# Patient Record
Sex: FEMALE | Race: WHITE | NOT HISPANIC OR LATINO | Employment: FULL TIME | URBAN - METROPOLITAN AREA
[De-identification: names, ages, dates, MRNs, and addresses within clinical notes are randomized per-mention and may not be internally consistent; named-entity substitution may affect disease eponyms.]

---

## 2019-09-23 ENCOUNTER — RECORDS - HEALTHEAST (OUTPATIENT)
Dept: LAB | Facility: HOSPITAL | Age: 39
End: 2019-09-23

## 2019-09-24 LAB — RUBV IGG SERPL QL IA: POSITIVE

## 2019-09-25 LAB
GAMMA INTERFERON BACKGROUND BLD IA-ACNC: 0.03 IU/ML
M TB IFN-G BLD-IMP: NEGATIVE
MEV IGG SER IA-ACNC: POSITIVE
MITOGEN IGNF BCKGRD COR BLD-ACNC: -0.01 IU/ML
MITOGEN IGNF BCKGRD COR BLD-ACNC: 0 IU/ML
MUV IGG SER QL IA: POSITIVE
QTF INTERPRETATION: NORMAL
QTF MITOGEN - NIL: 8.62 IU/ML
VZV IGG SER QL IA: POSITIVE

## 2019-11-22 ENCOUNTER — RECORDS - HEALTHEAST (OUTPATIENT)
Dept: LAB | Facility: HOSPITAL | Age: 39
End: 2019-11-22

## 2019-11-22 LAB — HBV SURFACE AB SERPL IA-ACNC: POSITIVE M[IU]/ML

## 2023-09-28 ENCOUNTER — PATIENT OUTREACH (OUTPATIENT)
Dept: ONCOLOGY | Facility: CLINIC | Age: 43
End: 2023-09-28

## 2023-09-28 ENCOUNTER — APPOINTMENT (OUTPATIENT)
Dept: GENERAL RADIOLOGY | Facility: CLINIC | Age: 43
End: 2023-09-28
Attending: FAMILY MEDICINE
Payer: COMMERCIAL

## 2023-09-28 ENCOUNTER — HOSPITAL ENCOUNTER (EMERGENCY)
Facility: CLINIC | Age: 43
Discharge: HOME | End: 2023-09-28
Attending: FAMILY MEDICINE | Admitting: FAMILY MEDICINE
Payer: COMMERCIAL

## 2023-09-28 ENCOUNTER — APPOINTMENT (OUTPATIENT)
Dept: CT IMAGING | Facility: CLINIC | Age: 43
End: 2023-09-28
Attending: FAMILY MEDICINE
Payer: COMMERCIAL

## 2023-09-28 VITALS
SYSTOLIC BLOOD PRESSURE: 139 MMHG | HEART RATE: 85 BPM | TEMPERATURE: 98.1 F | RESPIRATION RATE: 18 BRPM | HEIGHT: 67 IN | DIASTOLIC BLOOD PRESSURE: 114 MMHG | BODY MASS INDEX: 23.54 KG/M2 | WEIGHT: 150 LBS

## 2023-09-28 DIAGNOSIS — R07.89 CHEST PAIN, NON-CARDIAC: ICD-10-CM

## 2023-09-28 DIAGNOSIS — R91.1 PULMONARY NODULE: ICD-10-CM

## 2023-09-28 LAB
ALBUMIN SERPL BCG-MCNC: 4.3 G/DL (ref 3.5–5.2)
ALP SERPL-CCNC: 63 U/L (ref 35–104)
ALT SERPL W P-5'-P-CCNC: 47 U/L (ref 0–50)
ANION GAP SERPL CALCULATED.3IONS-SCNC: 13 MMOL/L (ref 7–15)
AST SERPL W P-5'-P-CCNC: 38 U/L (ref 0–45)
BASOPHILS # BLD AUTO: 0 10E3/UL (ref 0–0.2)
BASOPHILS NFR BLD AUTO: 1 %
BILIRUB SERPL-MCNC: 0.9 MG/DL
BUN SERPL-MCNC: 12.8 MG/DL (ref 6–20)
CALCIUM SERPL-MCNC: 9 MG/DL (ref 8.6–10)
CHLORIDE SERPL-SCNC: 99 MMOL/L (ref 98–107)
CREAT SERPL-MCNC: 0.61 MG/DL (ref 0.51–0.95)
D DIMER PPP FEU-MCNC: <0.27 UG/ML FEU (ref 0–0.5)
EGFRCR SERPLBLD CKD-EPI 2021: >90 ML/MIN/1.73M2
EOSINOPHIL # BLD AUTO: 0.3 10E3/UL (ref 0–0.7)
EOSINOPHIL NFR BLD AUTO: 4 %
ERYTHROCYTE [DISTWIDTH] IN BLOOD BY AUTOMATED COUNT: 12.8 % (ref 10–15)
FLUAV RNA SPEC QL NAA+PROBE: NEGATIVE
FLUBV RNA RESP QL NAA+PROBE: NEGATIVE
GLUCOSE SERPL-MCNC: 148 MG/DL (ref 70–99)
HCG SERPL QL: NEGATIVE
HCO3 SERPL-SCNC: 26 MMOL/L (ref 22–29)
HCT VFR BLD AUTO: 42 % (ref 35–47)
HGB BLD-MCNC: 13.9 G/DL (ref 11.7–15.7)
HOLD SPECIMEN: NORMAL
HOLD SPECIMEN: NORMAL
IMM GRANULOCYTES # BLD: 0 10E3/UL
IMM GRANULOCYTES NFR BLD: 0 %
LIPASE SERPL-CCNC: 21 U/L (ref 13–60)
LYMPHOCYTES # BLD AUTO: 1.8 10E3/UL (ref 0.8–5.3)
LYMPHOCYTES NFR BLD AUTO: 26 %
MCH RBC QN AUTO: 31 PG (ref 26.5–33)
MCHC RBC AUTO-ENTMCNC: 33.1 G/DL (ref 31.5–36.5)
MCV RBC AUTO: 94 FL (ref 78–100)
MONOCYTES # BLD AUTO: 0.6 10E3/UL (ref 0–1.3)
MONOCYTES NFR BLD AUTO: 8 %
NEUTROPHILS # BLD AUTO: 4.4 10E3/UL (ref 1.6–8.3)
NEUTROPHILS NFR BLD AUTO: 61 %
NRBC # BLD AUTO: 0 10E3/UL
NRBC BLD AUTO-RTO: 0 /100
PLATELET # BLD AUTO: 321 10E3/UL (ref 150–450)
POTASSIUM SERPL-SCNC: 4.1 MMOL/L (ref 3.4–5.3)
PROT SERPL-MCNC: 7.1 G/DL (ref 6.4–8.3)
RBC # BLD AUTO: 4.48 10E6/UL (ref 3.8–5.2)
RSV RNA SPEC NAA+PROBE: NEGATIVE
SARS-COV-2 RNA RESP QL NAA+PROBE: NEGATIVE
SODIUM SERPL-SCNC: 138 MMOL/L (ref 135–145)
TROPONIN T SERPL HS-MCNC: <6 NG/L
WBC # BLD AUTO: 7.2 10E3/UL (ref 4–11)

## 2023-09-28 PROCEDURE — 71046 X-RAY EXAM CHEST 2 VIEWS: CPT

## 2023-09-28 PROCEDURE — 250N000013 HC RX MED GY IP 250 OP 250 PS 637: Performed by: FAMILY MEDICINE

## 2023-09-28 PROCEDURE — 99284 EMERGENCY DEPT VISIT MOD MDM: CPT | Mod: 25 | Performed by: FAMILY MEDICINE

## 2023-09-28 PROCEDURE — 80053 COMPREHEN METABOLIC PANEL: CPT | Performed by: FAMILY MEDICINE

## 2023-09-28 PROCEDURE — 250N000011 HC RX IP 250 OP 636: Performed by: FAMILY MEDICINE

## 2023-09-28 PROCEDURE — 84703 CHORIONIC GONADOTROPIN ASSAY: CPT | Performed by: FAMILY MEDICINE

## 2023-09-28 PROCEDURE — 76604 US EXAM CHEST: CPT | Performed by: FAMILY MEDICINE

## 2023-09-28 PROCEDURE — 84484 ASSAY OF TROPONIN QUANT: CPT | Performed by: FAMILY MEDICINE

## 2023-09-28 PROCEDURE — 83690 ASSAY OF LIPASE: CPT | Performed by: FAMILY MEDICINE

## 2023-09-28 PROCEDURE — 76604 US EXAM CHEST: CPT | Mod: 26 | Performed by: FAMILY MEDICINE

## 2023-09-28 PROCEDURE — 85379 FIBRIN DEGRADATION QUANT: CPT | Performed by: FAMILY MEDICINE

## 2023-09-28 PROCEDURE — 36415 COLL VENOUS BLD VENIPUNCTURE: CPT | Performed by: FAMILY MEDICINE

## 2023-09-28 PROCEDURE — 87637 SARSCOV2&INF A&B&RSV AMP PRB: CPT | Performed by: FAMILY MEDICINE

## 2023-09-28 PROCEDURE — 250N000009 HC RX 250: Performed by: FAMILY MEDICINE

## 2023-09-28 PROCEDURE — 93308 TTE F-UP OR LMTD: CPT | Performed by: FAMILY MEDICINE

## 2023-09-28 PROCEDURE — 93308 TTE F-UP OR LMTD: CPT | Mod: 26 | Performed by: FAMILY MEDICINE

## 2023-09-28 PROCEDURE — 85025 COMPLETE CBC W/AUTO DIFF WBC: CPT | Performed by: FAMILY MEDICINE

## 2023-09-28 PROCEDURE — 93005 ELECTROCARDIOGRAM TRACING: CPT | Performed by: FAMILY MEDICINE

## 2023-09-28 PROCEDURE — 71260 CT THORAX DX C+: CPT

## 2023-09-28 PROCEDURE — 93010 ELECTROCARDIOGRAM REPORT: CPT | Performed by: FAMILY MEDICINE

## 2023-09-28 PROCEDURE — 99285 EMERGENCY DEPT VISIT HI MDM: CPT | Mod: 25 | Performed by: FAMILY MEDICINE

## 2023-09-28 RX ORDER — NITROGLYCERIN 0.4 MG/1
0.4 TABLET SUBLINGUAL EVERY 5 MIN PRN
Status: DISCONTINUED | OUTPATIENT
Start: 2023-09-28 | End: 2023-09-28 | Stop reason: HOSPADM

## 2023-09-28 RX ORDER — SUCRALFATE ORAL 1 G/10ML
1 SUSPENSION ORAL ONCE
Status: COMPLETED | OUTPATIENT
Start: 2023-09-28 | End: 2023-09-28

## 2023-09-28 RX ORDER — ASPIRIN 81 MG/1
324 TABLET, CHEWABLE ORAL ONCE
Status: COMPLETED | OUTPATIENT
Start: 2023-09-28 | End: 2023-09-28

## 2023-09-28 RX ORDER — IOPAMIDOL 755 MG/ML
74 INJECTION, SOLUTION INTRAVASCULAR ONCE
Status: COMPLETED | OUTPATIENT
Start: 2023-09-28 | End: 2023-09-28

## 2023-09-28 RX ORDER — PANTOPRAZOLE SODIUM 40 MG/1
40 TABLET, DELAYED RELEASE ORAL ONCE
Status: COMPLETED | OUTPATIENT
Start: 2023-09-28 | End: 2023-09-28

## 2023-09-28 RX ADMIN — IOPAMIDOL 74 ML: 755 INJECTION, SOLUTION INTRAVENOUS at 09:44

## 2023-09-28 RX ADMIN — ASPIRIN 81 MG CHEWABLE TABLET 324 MG: 81 TABLET CHEWABLE at 08:00

## 2023-09-28 RX ADMIN — PANTOPRAZOLE SODIUM 40 MG: 40 TABLET, DELAYED RELEASE ORAL at 07:18

## 2023-09-28 RX ADMIN — SUCRALFATE 1 G: 1 SUSPENSION ORAL at 07:18

## 2023-09-28 RX ADMIN — SODIUM CHLORIDE 58 ML: 9 INJECTION, SOLUTION INTRAVENOUS at 09:45

## 2023-09-28 ASSESSMENT — ACTIVITIES OF DAILY LIVING (ADL)
ADLS_ACUITY_SCORE: 35

## 2023-09-28 ASSESSMENT — ENCOUNTER SYMPTOMS
BLOOD IN STOOL: 0
SINUS PRESSURE: 0
VOMITING: 0
FEVER: 0
SORE THROAT: 0
DIAPHORESIS: 0
ABDOMINAL PAIN: 0
WHEEZING: 0
SHORTNESS OF BREATH: 0
FREQUENCY: 0
COUGH: 0
HEADACHES: 0
PALPITATIONS: 0
DYSURIA: 0
DIARRHEA: 0
CHILLS: 0
NAUSEA: 0
CONSTIPATION: 0

## 2023-09-28 NOTE — ED TRIAGE NOTES
Pt presents with chest pain that started on Monday. States it is midsternal and at times will go up into her neck. On Monday the pain was severe enough to call EMS. The pain went up into her neck  and down both arms with some associated nausea.     Triage Assessment       Row Name 09/28/23 0707       Triage Assessment (Adult)    Airway WDL WDL       Respiratory WDL    Respiratory WDL WDL       Skin Circulation/Temperature WDL    Skin Circulation/Temperature WDL WDL       Cardiac WDL    Cardiac WDL X;chest pain       Chest Pain Assessment    Chest Pain Location midsternal    Chest Pain Radiation neck    Character burning  pulling    Precipitating Factors nothing    Alleviating Factors nothing    Associated Signs/Symptoms nausea  on Monday    Chest Pain Intervention 12-lead ECG obtained;cardiac monitor placed       Peripheral/Neurovascular WDL    Peripheral Neurovascular WDL WDL       Cognitive/Neuro/Behavioral WDL    Cognitive/Neuro/Behavioral WDL WDL

## 2023-09-28 NOTE — DISCHARGE INSTRUCTIONS
ICD-10-CM    1. Pulmonary nodule  R91.1 Adult Oncology/Hematology [facility] Referral    follow-up nodule clinic      2. Chest pain, non-cardiac  R07.89     unclear cause but very reassuring chest pain eval.  possible other causes include chest wall (pulling sensation) and esophageal (central chest radiating to neck).  avoid chest wall overuse, work  neck range of motion. see Jean Carlos Youtube chest wall pain.  stay upright 2 hours after a meal.  64  oz clear fluid.  avoid caffeine, alcohol.  start prilosec 20 mg orally daily for 30 days. stay on pepcid for the first week.

## 2023-09-28 NOTE — PROGRESS NOTES
New IP (Interventional Pulmonology) referral rec'd.  Chart reviewed.    Claudia Trinidad CNP gave me the ok to schedule with her on WED 10/11 at [place]      New Patient: Interventional Pulmonary (Lung nodule) Nurse Navigator Note    Referring provider:   Jason Benton MD [facility] [phone]       Referred to (specialty): Interventional Pulmonary (Lung nodule)    Requested provider (if applicable): n/a    Date Referral Received: 9/28/2023    Evaluation for :  1.8 cm lung nodule incidental on ED CT    Clinical History (per Nurse review of records provided):    **BOOK MARKED**  CT CHEST W CONTRAST 9/28/2023 9:54 AM     CLINICAL HISTORY: chest pain.  incidental large lung nodule rt.  eval  dissection and characterize lesion  TECHNIQUE: CT chest with IV contrast. Multiplanar reformats were  obtained. Dose reduction techniques were used.     CONTRAST: 74 mL Isovue-370     COMPARISON: Chest x-ray 9/28/2023     FINDINGS:   LUNGS AND PLEURA: No consolidation. No pleural effusions or  pneumothorax. Correlating with the finding on prior chest x-ray, there  is a well-circumscribed, rounded right suprahilar low density nodule  measuring 1.7 x 1.6 x 1.8 cm series 3, image 54, measuring 2  Hounsfield units on postcontrast imaging. No visible calcification. No  additional nodules or masses identified.     MEDIASTINUM/AXILLAE: Coarse calcification and low density nodule in  the left thyroid lobe. No thoracic adenopathy. Residual thymus in the  anterior mediastinum. Normal cardiac size. No pericardial effusion.  Normal caliber thoracic aorta. No acute abnormality. Although not a  dedicated PE study, no gross PE. No coronary artery calcifications.     UPPER ABDOMEN: There are a couple of low-density lesions in the liver,  measuring up to 1.4 cm in the left hepatic lobe.     MUSCULOSKELETAL: Negative bones. Bilateral breast implants.                                                                       IMPRESSION:   1.  Correlating with the finding on prior chest x-ray, there is a 1.8  cm low density right suprahilar nodule in the upper lobe. This is  indeterminate in nature, but could be benign, such as a hamartoma.  Please see follow-up guidelines below.     2.  Although not a dedicated CTA, exam is negative for acute aortic  dissection or aneurysm. Lungs are clear.     3.  Indeterminate low-density lesions in the liver can be further  evaluated by nonemergent hepatic ultrasound.    Records Location (Care Everywhere, Media, etc.): [facility]     Records Needed: none    Additional testing needed prior to consult: NONE

## 2023-09-28 NOTE — ED PROVIDER NOTES
History     Chief Complaint   Patient presents with    Chest Pain     HPI  Belinda Mullen is a 42 year old female who presents with anterior chest pain started on Monday midsternal and radiating into the neck.  Was severe enough to call EMS and they arrived evaluated scene she was not seen after that.  She has pain at times radiating to both arms and nausea.  No known history of heart disease.  She does take chronic Pepcid but did not relieve her current symptoms.  She was experiencing right upper abdominal pain.  She is status postcholecystectomy.    No known VTE risk.  History of diabetes diet-controlled A1c 6.5% consistently.  No associated exertional chest pain.  Pain may be worse in the chest associated with deep breathing.  No associated dyspnea.  No leg swelling.  No history of known heart or lung disease.  No asthma history.  Uses no tobacco.  Occasional alcohol.  Denies current pregnancy as she has an IUD in place and her fiancé had a vasectomy.  She has not had menstrual period in a decade she tells me.    Most of her pain is in the region of the upper chest and neck.  No associated neurologic changes.    Denies recent prolonged travel (>3 hours) by car or plane, history or FHx of venous thromboembolism, recent surgery (last 4 weeks), active cancer history, hypercoagulable state, estrogen or other medications/conditions causing VTE or  new unilateral swelling or pain in the legs or calves.        Allergies:  Not on File    Problem List:    There are no problems to display for this patient.       Past Medical History:    No past medical history on file.    Past Surgical History:    No past surgical history on file.    Family History:    No family history on file.    Social History:  Marital Status:          Medications:    No current outpatient medications on file.        Review of Systems   Constitutional:  Negative for chills, diaphoresis and fever.   HENT:  Negative for ear pain, sinus pressure and sore  "throat.    Eyes:  Negative for visual disturbance.   Respiratory:  Negative for cough, shortness of breath and wheezing.    Cardiovascular:  Positive for chest pain. Negative for palpitations.   Gastrointestinal:  Negative for abdominal pain, blood in stool, constipation, diarrhea, nausea and vomiting.   Genitourinary:  Negative for dysuria, frequency and urgency.   Skin:  Negative for rash.   Neurological:  Negative for headaches.   All other systems reviewed and are negative.      Physical Exam   BP: (!) 139/114  Pulse: 85  Temp: 98.1  F (36.7  C)  Resp: 18  Height: 170.2 cm (5' 7\")  Weight: 68 kg (150 lb)      Physical Exam  Constitutional:       General: She is in acute distress.      Appearance: She is not diaphoretic.   HENT:      Head: Atraumatic.   Eyes:      Conjunctiva/sclera: Conjunctivae normal.   Cardiovascular:      Rate and Rhythm: Normal rate and regular rhythm.      Heart sounds: No murmur heard.  Pulmonary:      Effort: Pulmonary effort is normal. No respiratory distress.      Breath sounds: Normal breath sounds. No stridor. No wheezing, rhonchi or rales.   Chest:      Chest wall: No tenderness.   Abdominal:      General: Abdomen is flat. There is no distension.      Palpations: Abdomen is soft. There is no mass.      Tenderness: There is no abdominal tenderness. There is no guarding.   Musculoskeletal:      Cervical back: Neck supple.      Right lower leg: No edema.      Left lower leg: No edema.   Skin:     Coloration: Skin is not pale.      Findings: No rash.   Neurological:      General: No focal deficit present.      Mental Status: She is alert.      Motor: No weakness.       Symmetric radial pulses.    ED Course                 Procedures                EKG Interpretation:      Interpreted by Jason Benton MD  EKG done at 0709 hrs. demonstrates a sinus rhythm 77 bpm normal axis.  There is no ST change.  No T wave changes.  Normal R progression and no Q waves.  Normal intervals.  Normal " conduction.  No ectopy.  Impression sinus rhythm 77 bpm no acute change.    Critical Care time:  none               Results for orders placed or performed during the hospital encounter of 09/28/23 (from the past 24 hour(s))   Troponin T, High Sensitivity   Result Value Ref Range    Troponin T, High Sensitivity <6 <=14 ng/L   CBC with platelets, differential    Narrative    The following orders were created for panel order CBC with platelets, differential.  Procedure                               Abnormality         Status                     ---------                               -----------         ------                     CBC with platelets and d...[[id number]]                      Final result                 Please view results for these tests on the individual orders.   Emmet Draw    Narrative    The following orders were created for panel order Emmet Draw.  Procedure                               Abnormality         Status                     ---------                               -----------         ------                     Extra Blue Top Tube[[id number]]                              Final result               Extra Red Top Tube[[id number]]                               Final result                 Please view results for these tests on the individual orders.   CBC with platelets and differential   Result Value Ref Range    WBC Count 7.2 4.0 - 11.0 10e3/uL    RBC Count 4.48 3.80 - 5.20 10e6/uL    Hemoglobin 13.9 11.7 - 15.7 g/dL    Hematocrit 42.0 35.0 - 47.0 %    MCV 94 78 - 100 fL    MCH 31.0 26.5 - 33.0 pg    MCHC 33.1 31.5 - 36.5 g/dL    RDW 12.8 10.0 - 15.0 %    Platelet Count 321 150 - 450 10e3/uL    % Neutrophils 61 %    % Lymphocytes 26 %    % Monocytes 8 %    % Eosinophils 4 %    % Basophils 1 %    % Immature Granulocytes 0 %    NRBCs per 100 WBC 0 <1 /100    Absolute Neutrophils 4.4 1.6 - 8.3 10e3/uL    Absolute Lymphocytes 1.8 0.8 - 5.3 10e3/uL    Absolute Monocytes 0.6 0.0 - 1.3 10e3/uL     Absolute Eosinophils 0.3 0.0 - 0.7 10e3/uL    Absolute Basophils 0.0 0.0 - 0.2 10e3/uL    Absolute Immature Granulocytes 0.0 <=0.4 10e3/uL    Absolute NRBCs 0.0 10e3/uL   Extra Blue Top Tube   Result Value Ref Range    Hold Specimen JIC    Extra Red Top Tube   Result Value Ref Range    Hold Specimen JIC    HCG qualitative pregnancy (blood)   Result Value Ref Range    hCG Serum Qualitative Negative Negative   D dimer quantitative   Result Value Ref Range    D-Dimer Quantitative <0.27 0.00 - 0.50 ug/mL FEU    Narrative    This D-dimer assay is intended for use in conjunction with a clinical pretest probability assessment model to exclude pulmonary embolism (PE) and deep venous thrombosis (DVT) in outpatients suspected of PE or DVT. The cut-off value is 0.50 ug/mL FEU.   Symptomatic Influenza A/B, RSV, & SARS-CoV2 PCR (COVID-19) Nose    Specimen: Nose; Swab   Result Value Ref Range    Influenza A PCR Negative Negative    Influenza B PCR Negative Negative    RSV PCR Negative Negative    SARS CoV2 PCR Negative Negative    Narrative    Testing was performed using the Xpert Xpress CoV2/Flu/RSV Assay on the Cepheid GeneXpert Instrument. This test should be ordered for the detection of SARS-CoV-2, influenza, and RSV viruses in individuals who meet clinical and/or epidemiological criteria. Test performance is unknown in asymptomatic patients. This test is for in vitro diagnostic use under the FDA EUA for laboratories certified under CLIA to perform high or moderate complexity testing. This test has not been FDA cleared or approved. A negative result does not rule out the presence of PCR inhibitors in the specimen or target RNA in concentration below the limit of detection for the assay. If only one viral target is positive but coinfection with multiple targets is suspected, the sample should be re-tested with another FDA cleared, approved, or authorized test, if coinfection would change clinical management. This test was  validated by the [facility]. These laboratories are certified under the Clinical Laboratory Improvement Amendments of 1988 (CLIA-88) as qualified to perform high complexity laboratory testing.   POC US CHEST B-SCAN    Impression    [facility] Procedure Note      Limited Bedside ED Cardiac Ultrasound:    PROCEDURE: PERFORMED BY: Dr. Jason Benton MD  INDICATIONS/SYMPTOM:  Chest Pain  PROBE: Cardiac phased array probe and High frequency linear probe  BODY LOCATION: Chest  FINDINGS:   The ultrasound was performed utilizing the subcostal, parasternal long axis, and parasternal short axis views.  Cardiac contractility:  Present  Gross estimation of cardiac kinesis: normal  Pericardial Effusion:  None  RV:LV ratio: LV > RV  IVC:    Diameter:  IVC diameter expiration (IVCe) 2 cm                                                   IVC diameter inspiration (IVCi) 0 cm                                                       Collapsibility:  IVC collapses > 50% with inspiration  INTERPRETATION:    Chamber size and motion were grossly normal with LV > RV, normal cardiac kinesis.  No pericardial effusion was found.  IVC visualized and findings indicate normovolemia.  IMAGE DOCUMENTATION: Images were archived to PACs system.        [facility] Procedure Note      Limited Bedside ED Ultrasound of Thorax:    PROCEDURE: PERFORMED BY: Dr. Jason Benton MD  INDICATIONS/SYMPTOM:  Chest pain  PROBE: High frequency linear probe  BODY LOCATION: Chest  FINDINGS:  Images of both lung hemithoracies taken in 2D in multiple rib spaces        Right side:  Lung sliding artifact  Present     Comet tail artifacts  Absent   Left side:  Lung sliding artifact  Present     Comet tail artifacts  Absent   Hemothorax: Right side Absent     Left side Absent   Pleural effusion: Right side Absent      Left side Absent    INTERPRETATION: The exam was normal. There was no free fluid identified in the chest cavity. No evidence  of pneumothorax, hemothorax or pleural effusion.  IMAGE DOCUMENTATION: Images were archived to PACs system.         Comprehensive metabolic panel   Result Value Ref Range    Sodium 138 135 - 145 mmol/L    Potassium 4.1 3.4 - 5.3 mmol/L    Carbon Dioxide (CO2) 26 22 - 29 mmol/L    Anion Gap 13 7 - 15 mmol/L    Urea Nitrogen 12.8 6.0 - 20.0 mg/dL    Creatinine 0.61 0.51 - 0.95 mg/dL    GFR Estimate >90 >60 mL/min/1.73m2    Calcium 9.0 8.6 - 10.0 mg/dL    Chloride 99 98 - 107 mmol/L    Glucose 148 (H) 70 - 99 mg/dL    Alkaline Phosphatase 63 35 - 104 U/L    AST 38 0 - 45 U/L    ALT 47 0 - 50 U/L    Protein Total 7.1 6.4 - 8.3 g/dL    Albumin 4.3 3.5 - 5.2 g/dL    Bilirubin Total 0.9 <=1.2 mg/dL   Lipase   Result Value Ref Range    Lipase 21 13 - 60 U/L   Chest XR,  PA & LAT    Narrative    CHEST TWO VIEWS  9/28/2023 8:46 AM     HISTORY:  Chest pain.    COMPARISON: None.      Impression    IMPRESSION: Indeterminate nodular density projected over the right  suprahilar region measures 2.3 cm. Chest CT is recommended to exclude  a pulmonary nodule. The lungs are otherwise clear. No pleural  effusions. Heart size and pulmonary vascularity are within normal  limits.     JOHNNIE BARJAAS MD         SYSTEM ID:  [id number]   Chest CT - IV contrast only - TRAUMA / DISSECTION    Narrative    CT CHEST W CONTRAST 9/28/2023 9:54 AM    CLINICAL HISTORY: chest pain.  incidental large lung nodule rt.  eval  dissection and characterize lesion  TECHNIQUE: CT chest with IV contrast. Multiplanar reformats were  obtained. Dose reduction techniques were used.    CONTRAST: 74 mL Isovue-370    COMPARISON: Chest x-ray 9/28/2023    FINDINGS:   LUNGS AND PLEURA: No consolidation. No pleural effusions or  pneumothorax. Correlating with the finding on prior chest x-ray, there  is a well-circumscribed, rounded right suprahilar low density nodule  measuring 1.7 x 1.6 x 1.8 cm series 3, image 54, measuring 2  Hounsfield units on postcontrast imaging.  No visible calcification. No  additional nodules or masses identified.    MEDIASTINUM/AXILLAE: Coarse calcification and low density nodule in  the left thyroid lobe. No thoracic adenopathy. Residual thymus in the  anterior mediastinum. Normal cardiac size. No pericardial effusion.  Normal caliber thoracic aorta. No acute abnormality. Although not a  dedicated PE study, no gross PE. No coronary artery calcifications.    UPPER ABDOMEN: There are a couple of low-density lesions in the liver,  measuring up to 1.4 cm in the left hepatic lobe.    MUSCULOSKELETAL: Negative bones. Bilateral breast implants.      Impression    IMPRESSION:   1.  Correlating with the finding on prior chest x-ray, there is a 1.8  cm low density right suprahilar nodule in the upper lobe. This is  indeterminate in nature, but could be benign, such as a hamartoma.  Please see follow-up guidelines below.    2.  Although not a dedicated CTA, exam is negative for acute aortic  dissection or aneurysm. Lungs are clear.    3.  Indeterminate low-density lesions in the liver can be further  evaluated by nonemergent hepatic ultrasound.    REFERENCE:  Guidelines for Management of Incidental Pulmonary Nodules Detected on  CT Images: From the Fleischner Society 2017.   Guidelines apply to incidental nodules in patients who are 35 years or  older.  Guidelines do not apply to lung cancer screening, patients with  immunosuppression, or patients with known primary cancer.    SINGLE NODULE    Nodule size >8 mm  Either low or high-risk patients:  Consider CT, PET/CT, or tissue sampling at 3 months.    Consider referral to lung nodule clinic.    LAM GONG MD         SYSTEM ID:  [id number]       Medications   sucralfate (CARAFATE) suspension 1 g (1 g Oral $Given 9/28/23 0718)   pantoprazole (PROTONIX) EC tablet 40 mg (40 mg Oral $Given 9/28/23 0718)       Assessments & Plan (with Medical Decision Making)     MDM: Belinda Mullen is a 42 year old female who  presents with chest pain onset Monday was severe enough to call an ambulance.  No associated other cardiopulmonary symptoms and not exertional.  Has not yet been seen for this.  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  Symptoms without significant relief with sucralfate.  Pain is primarily in the upper neck region and at times it will radiate into both arms as it did Monday.  That has not occurred since.  No associated significant gastrointestinal symptoms other than upper abdominal discomfort at times.  Does have underlying diabetes type 2 diet controlled A1c 6.5.  Initial blood pressure elevated but doubt the value given the narrow pulse pressure as the second systolic diastolic was normal.  EKG is nonischemic.  Her heart rate was elevated Monday night to as high as 130.  Although she has no known risk factors for PE, given the periodic elevated heart rate, the pleuritic pain, I would recommend that we obtain D-dimer labs as well and I did discuss this with her.  She has normal pulses symmetrically both radial.  My suspicion for aortic dissection is low.  I would however recommend evaluating for acute coronary syndrome, pulmonary embolism.  Her bedside ultrasound is reassuring with normal function.  No signs of right heart strain      Her chest x-ray did show an incidental lung nodule.  We discussed the findings.  This is fairly good-sized and will need CT imaging for characterization.  This could be performed out of a follow-up outpatient CT.  However she did describe symptoms that were worse as she extended and flexed her neck.  She was pointing in a region that was similar in the chest.  The 1 emergent condition that was not completely excluded was dissection although less likely.  We discussed that we could characterize this lesion and also CT with contrast.  She like to go forward with this.  The imaging was reassuring although his shows and characterize this nodule and will need follow-up.  Referred to the  nodule clinic.    As for the chest pain this could certainly be musculoskeletal she notes with head position and forward neck flexion she will have pain in this region.  Upper thoracic back could potentially cause a radicular symptom in this area.  It could also be chest wall muscle irritation.  I am also suspicious for gastrointestinal causes.  We discussed empiric management as below and following up in clinic.  I have given precautions for return.  Findings today are reassuring.    At more than 48 hours of symptoms a single troponin was more than sufficient.    I have reviewed the nursing notes.    I have reviewed the findings, diagnosis, plan and need for follow up with the patient.           Medical Decision Making  The patient's presentation was of moderate complexity (an acute illness with systemic symptoms).    The patient's evaluation involved:  ordering and/or review of 3+ test(s) in this encounter (see separate area of note for details)    The patient's management necessitated moderate risk (prescription drug management including medications given in the ED).        New Prescriptions    No medications on file       Final diagnoses:   Pulmonary nodule - follow-up nodule clinic   Chest pain, non-cardiac - unclear cause but very reassuring chest pain eval.  possible other causes include chest wall (pulling sensation) and esophageal (central chest radiating to neck).  avoid chest wall overuse, work  neck range of motion. see Rolando and Jadiel Youtube chest wall pain.  stay upright 2 hours after a meal.  64  oz clear fluid.  avoid caffeine, alcohol.  start prilosec 20 mg orally daily for 30 days. stay on pepcid for the first week.       9/28/2023   [facility] EMERGENCY DEPT       Jason Benton MD  09/28/23 2741

## 2023-10-10 NOTE — PROGRESS NOTES
THORACIC SURGERY - NEW PATIENT OFFICE VISIT      Dear Dr. Devries,    I saw Belinda Mullen at Dr. Benton request in consultation for the evaluation and treatment of an indeterminate right upper lobe lung nodule found during a work up for chest pain.     HPI  Belinda Mullen is a 42 year old female who presented to the Emergency Department on 09/28/2023 with complaints of anterior chest pain that also radiated to her neck. Cardiac work up was negative however on the PE chest CT a right hilar nodule was identified.    Previsit Tests   CT chest: 1.8 cm right supra hilar lymph node in the right upper lobe indeterminate in nature.   PMH  Reviewed, as below    Past Medical History:   Diagnosis Date    Diabetes (H)         PSH  Reviewed, as below    Past Surgical History:   Procedure Laterality Date    CHOLECYSTECTOMY        Social History     Socioeconomic History    Marital status: Single     Spouse name: Not on file    Number of children: Not on file    Years of education: Not on file    Highest education level: Not on file   Occupational History    Not on file   Tobacco Use    Smoking status: Never     Passive exposure: Never    Smokeless tobacco: Never   Substance and Sexual Activity    Alcohol use: Not on file    Drug use: Not on file    Sexual activity: Not on file   Other Topics Concern    Not on file   Social History Narrative    Not on file     Social Determinants of Health     Financial Resource Strain: Not on file   Food Insecurity: Not on file   Transportation Needs: Not on file   Physical Activity: Not on file   Stress: Not on file   Social Connections: Not on file   Interpersonal Safety: Not on file   Housing Stability: Not on file      SUBJECTIVE  Belinda denies shortness of breath or any respiratory symptoms. She talked to her mom about the CT finding and her mom reassured her that nodules are common and most likely all that would be needed is follow up imaging.    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    IMPRESSION   This person is a 42 year old female with an incidentally found right supra hilar lung nodule.       We reviewed the incidence of lung nodules. Approximately 50 of the general population in the [place] have at least one lung nodule. Most of these are not anything concerning. The [facility] soil has certain bacterial and fungal spores that can become airborne and then breathed in by us.Most of the time, the cilia (little hairs) in our nose catch them and we sneeze them out. Sometimes the spores can get past the cilia and make it into the airway and the airway spasms inducing us to cough the spore out. In some cases, the spore makes it all the way into the lungs. In most cases, our immune system walls them off to prevent them from causing trouble. This is what we are seeing on the images as a nodule.    Sometimes a nodule can be called \"new\" and this could very well be a new nodule or it could have been a nodule that was there before but just not picked up on prior images. In Belinda's case, we do not have prior imaging so this nodule could have been there for some time.    These nodules can resolve and sometimes they do not. This is why we watch them over a period of time with repeat chest imaging. This gives us information about the behavior of the nodule(s). If the nodule(s) has been stable (no changes) for two consecutive years, then statistically they are considered benign. In most cases, malignant nodules will have some sort of change over that time period. Of course, risk factor is a consideration in determining whether to continue to follow the nodule(s) for more than two years.      PLAN  I spent 30 min on the date of the encounter in chart review, patient visit, review of tests, documentation and/or " discussion with other providers about the issues documented above. I reviewed the plan as follows:    Follow up with me in 3 months with chest CT prior  Necessary Tests & Appointments: CT Chest    I appreciate the opportunity to participate in the care of your patient and will keep you updated.  Sincerely,    KRISTIN Pappas CNS

## 2023-10-11 ENCOUNTER — ONCOLOGY VISIT (OUTPATIENT)
Dept: SURGERY | Facility: CLINIC | Age: 43
End: 2023-10-11
Attending: FAMILY MEDICINE
Payer: COMMERCIAL

## 2023-10-11 VITALS
OXYGEN SATURATION: 100 % | BODY MASS INDEX: 23.54 KG/M2 | SYSTOLIC BLOOD PRESSURE: 106 MMHG | HEART RATE: 75 BPM | WEIGHT: 150 LBS | HEIGHT: 67 IN | TEMPERATURE: 97.5 F | DIASTOLIC BLOOD PRESSURE: 71 MMHG

## 2023-10-11 DIAGNOSIS — R91.1 PULMONARY NODULE: ICD-10-CM

## 2023-10-11 PROCEDURE — 99203 OFFICE O/P NEW LOW 30 MIN: CPT | Performed by: CLINICAL NURSE SPECIALIST

## 2023-10-11 RX ORDER — LORATADINE 10 MG/1
10 TABLET, ORALLY DISINTEGRATING ORAL DAILY
COMMUNITY

## 2023-10-11 RX ORDER — PROCHLORPERAZINE 25 MG/1
SUPPOSITORY RECTAL
COMMUNITY
Start: 2023-08-31

## 2023-10-11 RX ORDER — METFORMIN HCL 500 MG
500 TABLET, EXTENDED RELEASE 24 HR ORAL
COMMUNITY
Start: 2023-10-09

## 2023-10-11 ASSESSMENT — PAIN SCALES - GENERAL: PAINLEVEL: NO PAIN (0)

## 2023-12-21 ENCOUNTER — HOSPITAL ENCOUNTER (OUTPATIENT)
Dept: CT IMAGING | Facility: CLINIC | Age: 43
Discharge: HOME | End: 2023-12-21
Attending: CLINICAL NURSE SPECIALIST | Admitting: CLINICAL NURSE SPECIALIST
Payer: COMMERCIAL

## 2023-12-21 DIAGNOSIS — R91.1 PULMONARY NODULE: ICD-10-CM

## 2023-12-21 PROCEDURE — 71250 CT THORAX DX C-: CPT

## 2023-12-27 ENCOUNTER — OFFICE VISIT (OUTPATIENT)
Dept: SURGERY | Facility: CLINIC | Age: 43
End: 2023-12-27
Payer: COMMERCIAL

## 2023-12-27 VITALS
WEIGHT: 150 LBS | TEMPERATURE: 98.3 F | DIASTOLIC BLOOD PRESSURE: 98 MMHG | OXYGEN SATURATION: 98 % | SYSTOLIC BLOOD PRESSURE: 135 MMHG | HEIGHT: 67 IN | HEART RATE: 90 BPM | BODY MASS INDEX: 23.54 KG/M2

## 2023-12-27 DIAGNOSIS — R91.1 PULMONARY NODULE: Primary | ICD-10-CM

## 2023-12-27 PROCEDURE — 99213 OFFICE O/P EST LOW 20 MIN: CPT | Performed by: CLINICAL NURSE SPECIALIST

## 2023-12-27 RX ORDER — VITAMIN B COMPLEX
TABLET ORAL DAILY
COMMUNITY

## 2023-12-27 RX ORDER — FAMOTIDINE 20 MG/1
20 TABLET, FILM COATED ORAL 2 TIMES DAILY PRN
COMMUNITY

## 2023-12-27 ASSESSMENT — PAIN SCALES - GENERAL: PAINLEVEL: NO PAIN (0)

## 2023-12-27 NOTE — PROGRESS NOTES
"THORACIC SURGERY FOLLOW UP VISIT      I saw Ms. Belinda Mullen in follow-up today. 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 Consider follow-up in 6-12 months to assess for ongoing stability.    Past Medical History:   Diagnosis Date    Diabetes (H)       Past Surgical History:   Procedure Laterality Date    CHOLECYSTECTOMY        Social History     Socioeconomic History    Marital status: Single     Spouse name: Not on file    Number of children: Not on file    Years of education: Not on file    Highest education level: Not on file   Occupational History    Not on file   Tobacco Use    Smoking status: Never     Passive exposure: Never    Smokeless tobacco: Never   Substance and Sexual Activity    Alcohol use: Not on file    Drug use: Not on file    Sexual activity: Not on file   Other Topics Concern    Not on file   Social History Narrative    Not on file     Social Determinants of Health     Financial Resource Strain: Not on file   Food Insecurity: Not on file   Transportation Needs: Not on file   Physical Activity: Not on file   Stress: Not on file   Social Connections: Not on file   Interpersonal Safety: Not on file   Housing Stability: Not on file      SUBJECTIVE  Belinda denies any shortness of breath or cough but her kids did give her the crud over [place]. She was febrile for a few days but she is feeling better now.    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    IMPRESSION   43 year-old female who presented to the Emergency Department on 09/28/2023 with complaints of anterior chest pain that also radiated to her neck. Cardiac work " up was negative however on the PE chest CT a right hilar nodule was identified.  She is here today to review the results of her most recent chest CT for lung nodule follow up.    PLAN  I spent 25 min on the date of the encounter in chart review, patient visit, review of tests, documentation and/or discussion with other providers about the issues documented above. I reviewed the plan as follows:  Follow up with me in 6 months with chest CT prior  Necessary Tests & Appointments: chest CT  All questions were answered and the patient and present family were in agreement with the plan.  I appreciate the opportunity to participate in the care of your patient and will keep you updated.  Sincerely,

## 2023-12-27 NOTE — LETTER
"    12/27/2023         RE: Belinda Mullen  [address] [facility] [address]        Dear Colleague,    Thank you for referring your patient, Belinda Mullen, to the [facility]. Please see a copy of my visit note below.    THORACIC SURGERY FOLLOW UP VISIT      I saw Ms. Belinda Mullen in follow-up today. 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 Consider follow-up in 6-12 months to assess for ongoing stability.    Past Medical History:   Diagnosis Date     Diabetes (H)       Past Surgical History:   Procedure Laterality Date     CHOLECYSTECTOMY        Social History     Socioeconomic History     Marital status: Single     Spouse name: Not on file     Number of children: Not on file     Years of education: Not on file     Highest education level: Not on file   Occupational History     Not on file   Tobacco Use     Smoking status: Never     Passive exposure: Never     Smokeless tobacco: Never   Substance and Sexual Activity     Alcohol use: Not on file     Drug use: Not on file     Sexual activity: Not on file   Other Topics Concern     Not on file   Social History Narrative     Not on file     Social Determinants of Health     Financial Resource Strain: Not on file   Food Insecurity: Not on file   Transportation Needs: Not on file   Physical Activity: Not on file   Stress: Not on file   Social Connections: Not on file   Interpersonal Safety: Not on file   Housing Stability: Not on file      SUBJECTIVE  Belinda denies any shortness of breath or cough but her kids did give her the crud over [place]. She was febrile for a few days but she is feeling better now.    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    IMPRESSION   43 year-old female who presented to the Emergency Department on 09/28/2023 with complaints of anterior chest pain that also radiated to her neck. Cardiac work up was negative however on the PE chest CT a right hilar nodule was identified.  She is here today to review the results of her most recent chest CT for lung nodule follow up.    PLAN  I spent 25 min on the date of the encounter in chart review, patient visit, review of tests, documentation and/or discussion with other providers about the issues documented above. I reviewed the plan as follows:  Follow up with me in 6 months with chest CT prior  Necessary Tests & Appointments: chest CT  All questions were answered and the patient and present family were in agreement with the plan.  I appreciate the opportunity to participate in the care of your patient and will keep you updated.  Sincerely,      Again, thank you for allowing me to participate in the care of your patient.        Sincerely,        KRISTIN Pappas CNS

## 2023-12-30 ENCOUNTER — HEALTH MAINTENANCE LETTER (OUTPATIENT)
Age: 43
End: 2023-12-30

## 2024-06-20 ENCOUNTER — HOSPITAL ENCOUNTER (OUTPATIENT)
Dept: CT IMAGING | Facility: CLINIC | Age: 44
Discharge: HOME | End: 2024-06-20
Attending: CLINICAL NURSE SPECIALIST | Admitting: CLINICAL NURSE SPECIALIST
Payer: COMMERCIAL

## 2024-06-20 ENCOUNTER — MYC MEDICAL ADVICE (OUTPATIENT)
Dept: SURGERY | Facility: CLINIC | Age: 44
End: 2024-06-20
Payer: COMMERCIAL

## 2024-06-20 DIAGNOSIS — R91.1 PULMONARY NODULE: ICD-10-CM

## 2024-06-20 PROCEDURE — 71250 CT THORAX DX C-: CPT

## 2024-06-21 DIAGNOSIS — R91.1 PULMONARY NODULE: Primary | ICD-10-CM

## 2024-06-21 NOTE — TELEPHONE ENCOUNTER
She does not have to keep the appointment if she doesn't want to. She should follow up in a year with another chest CT though. She can follow up with me or her PCP    Thanks  Claudia

## 2024-06-21 NOTE — TELEPHONE ENCOUNTER
Last OV 12/27/23. Per note:    MPRESSION   43 year-old female who presented to the Emergency Department on 09/28/2023 with complaints of anterior chest pain that also radiated to her neck. Cardiac work up was negative however on the PE chest CT a right hilar nodule was identified.  She is here today to review the results of her most recent chest CT for lung nodule follow up.     PLAN  I reviewed the plan as follows:  Follow up with me in 6 months with chest CT prior    Patient had her chest CT yesterday, 6/20/24:  IMPRESSION:  Stable mass in the right upper lobe is highly likely to  represent a hamartoma. Follow-up CT could be obtained in one year to  confirm.    Patient has a follow up appointment on 6/26/24. Wants to know if this is necessary since mass is stable?

## 2025-01-18 ENCOUNTER — HEALTH MAINTENANCE LETTER (OUTPATIENT)
Age: 45
End: 2025-01-18